# Patient Record
Sex: FEMALE | Race: WHITE
[De-identification: names, ages, dates, MRNs, and addresses within clinical notes are randomized per-mention and may not be internally consistent; named-entity substitution may affect disease eponyms.]

---

## 2019-11-15 ENCOUNTER — HOSPITAL ENCOUNTER (INPATIENT)
Dept: HOSPITAL 1 - ED | Age: 69
LOS: 3 days | Discharge: HOME | DRG: 542 | End: 2019-11-18
Attending: HOSPITALIST | Admitting: HOSPITALIST
Payer: COMMERCIAL

## 2019-11-15 VITALS — SYSTOLIC BLOOD PRESSURE: 150 MMHG | DIASTOLIC BLOOD PRESSURE: 60 MMHG

## 2019-11-15 VITALS
HEIGHT: 61 IN | HEIGHT: 61 IN | BODY MASS INDEX: 21.52 KG/M2 | BODY MASS INDEX: 21.52 KG/M2 | WEIGHT: 114 LBS | WEIGHT: 114 LBS

## 2019-11-15 VITALS — SYSTOLIC BLOOD PRESSURE: 121 MMHG | DIASTOLIC BLOOD PRESSURE: 55 MMHG

## 2019-11-15 DIAGNOSIS — Z85.89: ICD-10-CM

## 2019-11-15 DIAGNOSIS — M48.54XA: Primary | ICD-10-CM

## 2019-11-15 DIAGNOSIS — I10: ICD-10-CM

## 2019-11-15 DIAGNOSIS — Z79.899: ICD-10-CM

## 2019-11-15 DIAGNOSIS — Z87.440: ICD-10-CM

## 2019-11-15 DIAGNOSIS — I31.3: ICD-10-CM

## 2019-11-15 DIAGNOSIS — E03.9: ICD-10-CM

## 2019-11-15 DIAGNOSIS — N17.0: ICD-10-CM

## 2019-11-15 DIAGNOSIS — J91.8: ICD-10-CM

## 2019-11-15 LAB
ALBUMIN SERPL-MCNC: 3.6 G/DL (ref 3.4–5)
ALP SERPL-CCNC: 145 U/L (ref 46–116)
ALT SERPL-CCNC: 16 U/L (ref 14–59)
AST SERPL-CCNC: 41 U/L (ref 15–37)
BASOPHILS NFR BLD: 1.2 % (ref 0–2)
BILIRUB SERPL-MCNC: 0.47 MG/DL (ref 0.2–1)
BUN SERPL-MCNC: 35 MG/DL (ref 7–18)
CALCIUM SERPL-MCNC: 9.5 MG/DL (ref 8.5–10.1)
CHLORIDE SERPL-SCNC: 103 MMOL/L (ref 98–107)
CHOLEST SERPL-MCNC: 219 MG/DL (ref ?–200)
CHOLEST/HDLC SERPL: 5.1 MG/DL
CO2 SERPL-SCNC: 23.6 MMOL/L (ref 21–32)
CREAT SERPL-MCNC: 1.8 MG/DL (ref 0.6–1)
ERYTHROCYTE [DISTWIDTH] IN BLOOD BY AUTOMATED COUNT: 13.9 % (ref 11.5–14.5)
GFR SERPLBLD BASED ON 1.73 SQ M-ARVRAT: 30 ML/MIN
GLUCOSE SERPL-MCNC: 97 MG/DL (ref 74–106)
HDLC SERPL-MCNC: 43 MG/DL (ref 40–60)
MICROSCOPIC UR-IMP: YES
PLATELET # BLD: 295 X10^3MCL (ref 130–400)
POTASSIUM SERPL-SCNC: 3.7 MMOL/L (ref 3.5–5.1)
PROT SERPL-MCNC: 8.3 G/DL (ref 6.4–8.2)
RBC # UR STRIP.AUTO: (no result) /UL
SODIUM SERPL-SCNC: 139 MMOL/L (ref 136–145)
T3 SERPL-MCNC: 0.89 NG/ML
T3RU NFR SERPL: 35 % UPTAKE (ref 30–39)
T4 FREE SERPL-MCNC: 1.77 NG/DL (ref 0.76–1.46)
T4 SERPL-MCNC: 13.7 UG/DL (ref 4.7–13.3)
T4/T3 UPTAKE INDEX SERPL: 4.8 UG/DL (ref 1.4–4.5)
TRIGL SERPL-MCNC: 140 MG/DL (ref ?–150)
UA SPECIFIC GRAVITY: <=1.005 (ref 1–1.03)

## 2019-11-15 PROCEDURE — G0378 HOSPITAL OBSERVATION PER HR: HCPCS

## 2019-11-15 NOTE — NUR
PT PRESENTS TO ED WTIH C/O R SIDED CVA TENDERNESS THAT RADIATES TO THE LEFT
SIDE. PER PT SHE HAS HAD THIS PAIN FOR ABOUT ONE MONTH NOW AND HAS SEEN HER PCP
X 2 FOR THIS WHERE THEY DISGNOSED HER WITH A FRACTURE IN HER BACK AND THEN DID
A BONE DENSITY SCAN AS WELL AS A UTI AND WAS TREATED WITH ABX. PT STATES THE
PAIN IS STILL THERE. PT HAS PAIN UPON PALPATION. PT DENIES PAINFUL URINATION.
PT HAS HX OF CANCER HOWEVER PT STATE SHE IS IN REMISSION AND HAD HER G TUBE
TAKEN OUT IN AUGUST. NAD AT THIS TIME. PT AXO X 4. PT AMBULATES WITH STEADY
GAIT. PT SPEAKING IN CLEAR AND FULL SENTENCES. DAUGHTER IN LAW AT BEDSIDE

## 2019-11-15 NOTE — NUR
SEEN PATIENT COMFORTABLE, NO COMPLAINTS. IV NS INFUSING WELL AT 100CC/HR. IV
PATENT AND FLUSHED WELL. CALL LIGHT WITHIN REACH .BED AT LOWEST POSITION.

## 2019-11-15 NOTE — NUR
PT RECIEVED AAO WITH FAMILY AT THE BEDSIDE,REG RESP NO SOB V/S STABLE,IV
INFUSING WELL WITH THE SITE PATENT AND INTACT,BED IN THE LOW POSITION AND
LOCKED,NO PAIN REPORTED AT THIS TIME,CALL LIGHT EASY REACHED AND WILL CONTINUE
TO MONITOR.

## 2019-11-16 VITALS — DIASTOLIC BLOOD PRESSURE: 48 MMHG | SYSTOLIC BLOOD PRESSURE: 109 MMHG

## 2019-11-16 VITALS — SYSTOLIC BLOOD PRESSURE: 143 MMHG | DIASTOLIC BLOOD PRESSURE: 48 MMHG

## 2019-11-16 VITALS — DIASTOLIC BLOOD PRESSURE: 45 MMHG | SYSTOLIC BLOOD PRESSURE: 111 MMHG

## 2019-11-16 VITALS — DIASTOLIC BLOOD PRESSURE: 62 MMHG | SYSTOLIC BLOOD PRESSURE: 124 MMHG

## 2019-11-16 LAB
BASOPHILS NFR BLD: 0.5 % (ref 0–2)
BUN SERPL-MCNC: 27 MG/DL (ref 7–18)
CALCIUM SERPL-MCNC: 8.2 MG/DL (ref 8.5–10.1)
CHLORIDE SERPL-SCNC: 109 MMOL/L (ref 98–107)
CO2 SERPL-SCNC: 25.6 MMOL/L (ref 21–32)
CREAT SERPL-MCNC: 1.4 MG/DL (ref 0.6–1)
ERYTHROCYTE [DISTWIDTH] IN BLOOD BY AUTOMATED COUNT: 14.1 % (ref 11.5–14.5)
GFR SERPLBLD BASED ON 1.73 SQ M-ARVRAT: 40 ML/MIN
GLUCOSE SERPL-MCNC: 71 MG/DL (ref 74–106)
PLATELET # BLD: 242 X10^3MCL (ref 130–400)
POTASSIUM SERPL-SCNC: 4.4 MMOL/L (ref 3.5–5.1)
SODIUM SERPL-SCNC: 144 MMOL/L (ref 136–145)

## 2019-11-16 NOTE — NUR
DR. SUH MET WITH PT AND DISCUSSED POC. PT WILL NEED LUNG BIOPSY, THE
PROCEDURE WILL NOT BE ABLE TO BE DONE UNTIL MONDAY. PT AGREED WITH POC.
RECEIVED ORDER FOR INCENTIVE SPIROMETER. ORDER NOTED AND CARRIED OUT. PT MADE
AWARE.

## 2019-11-16 NOTE — NUR
OXYCODONE 5 MG PO GIVEN FOR SHARP INCREASING PAIN 6/10. EXTRA FLUIDS GIVEN AND
ENCOURAGED. SCHEDULED MEDS GIVEN AND TOLERATED WELL. FAMILY AT BEDSIDE. CALL
LIGHT WITHIN REACH.

## 2019-11-16 NOTE — NUR
OXYCODONE 5 MG PO GIVEN TO PT FOR BACK AND CHEST PAIN 6/10. EXTRA FLUIDS GIVEN
AND ENCOURAGED. RESP EVEN AND UNLABORED. FAMILY AT BEDSIDE VISITING. CALL
LIGHT WITHIN REACH. WILL CONTINUE TO MONITOR.

## 2019-11-16 NOTE — NUR
ROUTINE MEDICATIONS ADMINSTERED AND TOLERATED WELL. NO ACUTE DISTRESS NOTED.
BREATHING IS EVEN AND UNLABORED ON RA. NO RESP DISTRESS NOTED. BED IN LOWEST
POSITION. FAMILY AT BEDSIDE. WILL CONTINUE TO MONITOR.

## 2019-11-16 NOTE — NUR
PT ASSISTED TO BATHROOM AND BACK TO BED. PT AMBULATED WITH STEADY GAIT. BED IN
LOWEST POSITION. CALL LIGHT WITHIN REACH.

## 2019-11-16 NOTE — NUR
PT RECEIVED P/T TRAINING. PT AMBULATED HALLWAY AND BACK TO ROOM (100 FT) WITH
FWW WITH STEADY GAIT AND CONTACT GUARD.

## 2019-11-16 NOTE — NUR
PT WITH C/O OF PAIN AND PATIENT WAS MEDICATED WITH MORPHINE 1 MG IV AS ORDER
AND WILL CONTINUE TO MONITOR.

## 2019-11-16 NOTE — NUR
IV FLUIDS REFRESHED. PT STATES NEW PAIN MEDICATION IS VERY EFFECTIVE IN TX HER
PAIN. RESP EVEN AND UNLABORED. NO DISTRESS NOTED. CALL LIGHT WITHIN REACH.
FAMILY VISITING AT BEDSIDE.

## 2019-11-16 NOTE — NUR
PT IS AAOX4. RESP EVEN AND UNLABORED. NO DISTRESS NOTED. PT DENIES PAIN. IVF
RUNNING TO L[facility]. SITE WNL. NO S/S OF INFECTION OR INFILTRATION. BED IN LOWEST
POSITION. CALL LIGHT WITHIN REACH. WILL ENDORSE ALL CARE TO NOC RN.

## 2019-11-16 NOTE — NUR
PT IS AAOX4. RESP EVEN AND UNLABORED. LUNG SOUNDS DIMINISHED BILATERAL LOWER
LOBES. ON R/A. NO COUGH NOTED AT THIS TIME. IVF RUNNING TO [facility], SITE WNL. NO
S/S OF INFECTION OR INFILTRATION.  NO DISTRESS NOTED. DENIES PAIN. CALL LIGHT
WITHIN REACH. BED IN LOWEST POSITION.

## 2019-11-16 NOTE — NUR
RECEIVED REPORT FROM DAY SHIFT NURSE, ADALBERTO BECKETT. PT IS AAOX4. SPEECH IS
CLEAR. DENIES HA. FAMILY AT BEDSIDE. DENIES CP. PULSES ARE PALPABLE. NO EDEMA
NOTED. BREATHING IS EVEN AND UNLABORED ON RA. LUNG SOUNDS ARE CTA, BLL
DIMINISHED. NO SIGNS OF SOB OR RESP. DISTRESS. ENCOURAGED PT TO USE INCENTIVE
SPIROMETER. ABD IS SOFT AND NONDISTENDED. BS ARE HYPOACTIVE. VOIDS FREELY.
GENERALIZED WEAKNESS. SKIN INTACT. DENIES PAIN AT THIS TIME. BED IN LOWEST
POSITION. CALL LIGHT WITHIN REACH. WILL CONTINUE TO MONITOR.

## 2019-11-16 NOTE — NUR
DR. ROSA AND MEDICAL TEAM MET WITH PT AND DISCUSSED POC. PT IS TO HAVE
PULMONARY AND ONCOLOGY CONSULTS. SOME TEST NEEDED MAY NOT BE DONE UNTIL
MONDAY. DR. ROSA STATED PAIN MANAGEMENT IS A PRIORITY AND THE PT'S PRIOR
PRESCRIBED ULTRAM APPEARS TO BE INEFFECTIVE. PT HAS BEEN STARTED ON OXYCODONE
AND IF IT PROVES AN EFFECTIVE PAIN RELIEVER THE PT WILL BE PRESCRIBED IT UPON
DISCHARGE. PT AGREED WITH POC.

## 2019-11-17 VITALS — SYSTOLIC BLOOD PRESSURE: 138 MMHG | DIASTOLIC BLOOD PRESSURE: 47 MMHG

## 2019-11-17 VITALS — DIASTOLIC BLOOD PRESSURE: 57 MMHG | SYSTOLIC BLOOD PRESSURE: 130 MMHG

## 2019-11-17 VITALS — SYSTOLIC BLOOD PRESSURE: 150 MMHG | DIASTOLIC BLOOD PRESSURE: 63 MMHG

## 2019-11-17 VITALS — DIASTOLIC BLOOD PRESSURE: 53 MMHG | SYSTOLIC BLOOD PRESSURE: 140 MMHG

## 2019-11-17 VITALS — SYSTOLIC BLOOD PRESSURE: 143 MMHG | DIASTOLIC BLOOD PRESSURE: 57 MMHG

## 2019-11-17 LAB
BASOPHILS NFR BLD: 0.4 % (ref 0–2)
BUN SERPL-MCNC: 17 MG/DL (ref 7–18)
CALCIUM SERPL-MCNC: 8.4 MG/DL (ref 8.5–10.1)
CHLORIDE SERPL-SCNC: 111 MMOL/L (ref 98–107)
CO2 SERPL-SCNC: 23.4 MMOL/L (ref 21–32)
CREAT SERPL-MCNC: 1.3 MG/DL (ref 0.6–1)
ERYTHROCYTE [DISTWIDTH] IN BLOOD BY AUTOMATED COUNT: 14 % (ref 11.5–14.5)
GFR SERPLBLD BASED ON 1.73 SQ M-ARVRAT: 43 ML/MIN
GLUCOSE SERPL-MCNC: 79 MG/DL (ref 74–106)
PLATELET # BLD: 215 X10^3MCL (ref 130–400)
POTASSIUM SERPL-SCNC: 4.4 MMOL/L (ref 3.5–5.1)
SODIUM SERPL-SCNC: 144 MMOL/L (ref 136–145)

## 2019-11-17 NOTE — NUR
PT IS AAOX4. RESP EVEN AND UNLABORED. LUNG SOUND DIMINISHED BILATERALLY. ON
R/A. NO COUGH OR SOB NOTED. NORMAL S1S2 NOTED. IVF RUNNING TO LAC, SITE WNL.
NO S/S OF INFECTION OR INFILTRATION. PT HAS C/O DECREASED APPETITE. PT HAS
SCHEDULED MEGACE. PT DENIES PAIN AT THIS TIME. CALL LIGHT WITHIN REACH. SCDS
IN PLACE. BED IN LOWEST POSITION.

## 2019-11-17 NOTE — NUR
ROUTINE MEDICATIONS WERE ADMINISTERED AND TOLERATED WELL. NO ACUTE DISTRESS
NOTED. PT DID C/O 8/10 PAIN ON BACK. MEDICATED WITH OXYCODONE PRN PER MAR
ORDER. WILL REASSESS AND CHECK EFFECTIVENESS. ASKED PT IF SHE HAD ANY
QUESTIONS ABOUT CT LUNG BIOPSY TMRW. STATED SHE DID NOT HAVE ANY QUESTIONS.
CONSENT FORM SIGNS AND IN CHART. INSTRUCTED PT SHE WILL BE NPO AFTER MIDNIGHT.
PT VERBALIZES UNDERSTANDING. BED IN LOWEST POSITION. CALL LIGHT WITHIN REACH.
WILL CONTINUE TO [id number].

## 2019-11-17 NOTE — NUR
PT IS AAOX4. RESP EVEN AND UNLABORED. NO DISTRESS NOTED.  PT DENIES BACK PAIN
AT THIS TIME. IVF RUNNING TO LW. SITE WNL. NO S/S OF INFECTION OR
INFILTRATION NOTED. CALL LIGHT WITHIN REACH. BED IN LOWEST POSTION. SON
VISITING AT BEDSIDE. WILL ENDORSE CALL CARE TO NOC RN.

## 2019-11-17 NOTE — NUR
LAB REPORTED THAT PT IS POSITIVE FOR MRSA IN THE NARES. PT AND FAMILY
EDUCATED ON MRSA AND HYGIENE WHEN POSITIVE FOR MRSA. PT GIVEN PAMPLET WITH
EDUCATION ON MRSA. MRSA NOTIFICATION PAPER HAS BEEN SIGNED AND PLACED IN PT'S
CHART. PT AND FAMILY VERBALIZED UNDERSTANDING OF MRSA AND CONTACT PRECAUTIONS.
ALL QUESTIONS AND CONCERNS ANSWERED. DR. CADENA MADE AWARE. RECEIVED ORDER
FROM DR. CADENA FOR MRSA ORDER SET. ORDER NOTED AND CARRIED OUT. PT MADE
AWARE.

## 2019-11-17 NOTE — NUR
LAB REPORTED THAT PT IS POSITIVE FOR MRSA IN THE NARES. PT AND FAMILY EDUCATED
ON MRSA, HYGIENE WHEN POSITIVE FOR MRSA, AND GIVEN PAMPLET WITH EDUCATION ON
MRSA. MRSA NOTIFICATION PAPER HAS BEEN SIGNED AND PLACED IN PT'S CHART. PT AND
FAMILY VERBALIZED UNDERSTANDING ON MRSA AND CONTACT PRECATIONS. ALL QUESTIONS
AND CONCERNS ANSWERED. DR. CADENA MADE AWARE, RECEIVED ORDER FROM DR. CADENA
FOR MRSA ORDER SET.

## 2019-11-17 NOTE — NUR
PT RETURNED FROM BATHROOM AND STILL HAD NO BM. WILL INFORM DAY SHIFT NURSE.
DENIES ANY PAIN AT THIS TIME. BED IN LOWEST POSITION. CALL LIGHT WITHIN REACH.
WILL CONTINUE TO MONITOR.

## 2019-11-17 NOTE — NUR
RECEIVED REPORT FROM DAY SHIFT NURSE, ADALBERTO BECKETT. PT IS AAOX4. SPEECH IS
CLEAR. DENIES HA. SON AT BEDSIDE. DENIES CP. PULSES ARE PALPABLE. NO EDEMA
NOTED. BREATHING IS EVEN AND UNLABORED ON RA. LUNG SOUNDS CTA. ABD IS SOFT AND
NONDISTENDED. BS HYPOACTIVE. ABLE TO AMBULATE TO BATHROOM, BUT HAS MILD
GENERALIZED WEAKNESS. SKIN INTACT. PT STATES SHE HAS A LITTLE BIT OF PAIN, BUT
THAT IT IS BAREABLE. WILL REASSESS PAIN LEVEL. IV TO LW DRY AND INTACT. NO
ERYTHEMA NOTED. BED IN LOWEST POSITION. CALL LIGHT WITHIN REACH. WILL CONTINUE
TO MONTIOR.

## 2019-11-17 NOTE — NUR
OXYCODONE 5 MG PO GIVEN FOR BACK SPASMS 7/10. EXTRA FLUIDS GIVEN AND
ENCOURAGED. PT REPOSITIONED FOR COMFORT. SCHEDULED MEDS GIVEN AND TOLERATED
WELL. B/P 150/63, HR 60. CALL LIGHT WITHIN REACH.

## 2019-11-17 NOTE — NUR
PT C/O 8/10 PAIN ON BACK. MEDICATED WITH OXYCODONE PRN PER MAR ORDER. WILL
REASSESS EFFECTIVENESS. NO ACUTE DISTRESS NOTED. BED IN LOWEST POSITION. CALL
LIGHT WITHIN REACH. WILL CONTINUE TO MONITOR.

## 2019-11-17 NOTE — NUR
PT IS RESTING COMFORTABLY WITH EYES CLOSED, BUT EASILY AROUSABLE WHEN SPOKEN
TO. BREATHING IS EVEN AND UNLABORED ON RA. NO RESP. DISTRESS NOTED. BED IN
LOWEST POSITION. CALL LIGHT WITHIN REACH. WILL CONTINUE TO MONITOR.

## 2019-11-17 NOTE — NUR
OXYCODONE 5 MG PO GIVEN FOR ACHING BACK PAIN 7/10. EXTRA FLUIDS GIVEN AND
ENCOURAGED. IV CATH TO LAC FELL OUT. SITE WNL. COVERED WITH GAUZE AND BANDAID.
NEW IV CATH 22G STARTED ON 3RD ATTEMPT, PATENT WITH GOOD BLOOD RETURN. COVERED
WITH CDI OCCLUSIVE DRESSING. PT TOLERATED PROCEDURE WELL. BED IN LOW POSITION.
CALL LIGHT WITHIN REACH. SON AT BEDSIDE VISITING.

## 2019-11-17 NOTE — NUR
DR. ROSA AND MED TEAM MET WITH PT AND DISCUSSED POC. AS PER DR. SUH PT
WILL HAVE CT GUIDED LUNG BIOPSY ON MONDAY. PT CAN FOLLOW UP WITH THE RESULTS
OF THE SCAN WITH HER ONCOLOGIST. PT MAY D/C ON MONDAY AND PAIN MEDICATION WILL
BE PRESCRIBED TO USE AT HOME. PT AGREED WITH POC.

## 2019-11-18 VITALS — DIASTOLIC BLOOD PRESSURE: 55 MMHG | SYSTOLIC BLOOD PRESSURE: 121 MMHG

## 2019-11-18 VITALS — DIASTOLIC BLOOD PRESSURE: 61 MMHG | SYSTOLIC BLOOD PRESSURE: 157 MMHG

## 2019-11-18 VITALS — DIASTOLIC BLOOD PRESSURE: 61 MMHG | SYSTOLIC BLOOD PRESSURE: 163 MMHG

## 2019-11-18 VITALS — DIASTOLIC BLOOD PRESSURE: 48 MMHG | SYSTOLIC BLOOD PRESSURE: 157 MMHG

## 2019-11-18 VITALS — DIASTOLIC BLOOD PRESSURE: 53 MMHG | SYSTOLIC BLOOD PRESSURE: 157 MMHG

## 2019-11-18 VITALS — SYSTOLIC BLOOD PRESSURE: 157 MMHG | DIASTOLIC BLOOD PRESSURE: 53 MMHG

## 2019-11-18 VITALS — DIASTOLIC BLOOD PRESSURE: 54 MMHG | SYSTOLIC BLOOD PRESSURE: 139 MMHG

## 2019-11-18 LAB
BASOPHILS NFR BLD: 0.5 % (ref 0–2)
BUN SERPL-MCNC: 12 MG/DL (ref 7–18)
CALCIUM SERPL-MCNC: 8 MG/DL (ref 8.5–10.1)
CHLORIDE SERPL-SCNC: 113 MMOL/L (ref 98–107)
CO2 SERPL-SCNC: 23.6 MMOL/L (ref 21–32)
CREAT SERPL-MCNC: 1.2 MG/DL (ref 0.6–1)
ERYTHROCYTE [DISTWIDTH] IN BLOOD BY AUTOMATED COUNT: 14.4 % (ref 11.5–14.5)
GFR SERPLBLD BASED ON 1.73 SQ M-ARVRAT: 47 ML/MIN
GLUCOSE SERPL-MCNC: 76 MG/DL (ref 74–106)
PLATELET # BLD: 204 X10^3MCL (ref 130–400)
POTASSIUM SERPL-SCNC: 4.3 MMOL/L (ref 3.5–5.1)
SODIUM SERPL-SCNC: 144 MMOL/L (ref 136–145)

## 2019-11-18 PROCEDURE — 0BBJ3ZX EXCISION OF LEFT LOWER LUNG LOBE, PERCUTANEOUS APPROACH, DIAGNOSTIC: ICD-10-PCS

## 2019-11-18 NOTE — NUR
PT SLEPT IN INTERVALS THROUGHOUT THE NIGHT WITH NO ACUTE EVENTS OCCURRING
OVERNIGHT. COMFORT AND SAFETY MEASURES MAINTAINED. ALL NEEDS ASSESSED AND
ATTENDED TO. WILL CONTINUE TO MONITOR AND ENDORSE CARE TO DAY SHIFT NURSE.

## 2019-11-18 NOTE — NUR
BACK TO FLOOR AFTER BX DONE. V/S WNL. DRSG TO L UPPER BACK C/D/I. C/O BACK
PAIN ON 6/10. OXYCONTIN 5MG PO GIVEN. IVF RESUMED.

## 2019-11-18 NOTE — NUR
PT IS RESTING COMFORTABLY WITH EYES CLOSED, BUT EASILY AROUSABLE WHEN SPOKEN
TO. BREATHING IS EVEN AND UNLABORED ON RA. NO SIGNS OF RESP. DISTRESS. BED IN
LOWEST POSITION. CALL LIGHT WITHIN REACH. WILL CONTINUE TO MONITOR.

## 2019-11-18 NOTE — NUR
PT HAD BM. REPOSITIONED AND CHANGED. PT RESTING COMFORTABLY. NO ACUTE DISTRESS
NOTED. BED IN LOWEST POSITION. CALL LIGHT WITHIN REACH. WILL CONTINUE TO
MONITOR.

## 2019-11-18 NOTE — NUR
PT C/O 6/10 BACK PAIN. MEDICATED WITH MORPHINE IV PER MAR ORDER. WILL REASSESS
AND CHECK EFFECTIVENESS. BREATHING IS EVEN AND UNLABORED ON RA. NO SIGNS OF
RESP. DISTRESS. BED IN LOWEST POSITION. CALL LIGHT WITHIN REACH. WILL CONTINUE
TO MONITOR.

## 2019-11-18 NOTE — NUR
SHIFT ASSESSMENT DONE. PATIENT A/A/OX4; CLEAR SPEECH. NO TELE. DENIED CHEST
PAIN. NO SOB ON RA. BREATHING SOUND DIMINISHED JUSTIN BASES. O2 SAT 95% ON RA.
NPO FOR CCT GIUDE GELY BIOPSY THIS AM. IVF OF NS 100CC/HR; IV SITE CLEAN RFA.
AMBULATORY. GENERAL WEAKNESS. MRSA (+) NARES. CONTACT ISOLATION. OXYCONTIN 5MG
PO GIVEN AT 6:30 AM BY NOC NURSE. DENIED PAIN NOW. CALL LIGHT IN REACH.

## 2019-11-19 NOTE — NUR
**********PHYSICAL THERAPY DAILY NOTES CO-SIGN**********
All documentation done by the Physical Therapy Assistant for 11/18/19
has been reviewed. I agree with the documentation.
 
Reviewed/Co-Signed by: Key Aragon  PT
Documentation Done by:CASI KRISHNA